# Patient Record
Sex: FEMALE | Race: WHITE
[De-identification: names, ages, dates, MRNs, and addresses within clinical notes are randomized per-mention and may not be internally consistent; named-entity substitution may affect disease eponyms.]

---

## 2018-04-16 ENCOUNTER — HOSPITAL ENCOUNTER (EMERGENCY)
Dept: HOSPITAL 96 - M.ERS | Age: 63
Discharge: HOME | End: 2018-04-16
Payer: COMMERCIAL

## 2018-04-16 VITALS — DIASTOLIC BLOOD PRESSURE: 57 MMHG | SYSTOLIC BLOOD PRESSURE: 122 MMHG

## 2018-04-16 VITALS — HEIGHT: 61 IN | WEIGHT: 180.01 LBS | BODY MASS INDEX: 33.99 KG/M2

## 2018-04-16 DIAGNOSIS — E03.9: ICD-10-CM

## 2018-04-16 DIAGNOSIS — D64.9: Primary | ICD-10-CM

## 2018-04-16 LAB
%HYPO/RBC NFR BLD AUTO: (no result) %
ABSOLUTE BASOPHILS: 0 THOU/UL (ref 0–0.2)
ABSOLUTE EOSINOPHILS: 0.3 THOU/UL (ref 0–0.7)
ABSOLUTE MONOCYTES: 0.4 THOU/UL (ref 0–1.2)
ALBUMIN SERPL-MCNC: 3.5 G/DL (ref 3.4–5)
ALP SERPL-CCNC: 132 U/L (ref 46–116)
ALT SERPL-CCNC: 94 U/L (ref 30–65)
ANION GAP SERPL CALC-SCNC: 9 MMOL/L (ref 7–16)
ANISOCYTOSIS BLD QL SMEAR: (no result)
AST SERPL-CCNC: 66 U/L (ref 15–37)
BASOPHILS NFR BLD AUTO: 0.8 %
BILIRUB SERPL-MCNC: 0.6 MG/DL
BUN SERPL-MCNC: 14 MG/DL (ref 7–18)
CALCIUM SERPL-MCNC: 9.5 MG/DL (ref 8.5–10.1)
CHLORIDE SERPL-SCNC: 104 MMOL/L (ref 98–107)
CO2 SERPL-SCNC: 27 MMOL/L (ref 21–32)
CREAT SERPL-MCNC: 0.7 MG/DL (ref 0.6–1.3)
EOSINOPHIL NFR BLD: 5.4 %
GLUCOSE SERPL-MCNC: 112 MG/DL (ref 70–99)
GRANULOCYTES NFR BLD MANUAL: 67.2 %
HCT VFR BLD CALC: 23.1 % (ref 37–47)
HGB BLD-MCNC: 6.4 GM/DL (ref 12–15)
IRON SERPL-MCNC: 13 UG/DL (ref 50–175)
LYMPHOCYTES # BLD: 1.1 THOU/UL (ref 0.8–5.3)
LYMPHOCYTES NFR BLD AUTO: 19.6 %
MCH RBC QN AUTO: 16.1 PG (ref 26–34)
MCHC RBC AUTO-ENTMCNC: 27.6 G/DL (ref 28–37)
MCV RBC: 58.3 FL (ref 80–100)
MICROCYTES: (no result)
MONOCYTES NFR BLD: 7 %
MPV: 9 FL. (ref 7.2–11.1)
NEUTROPHILS # BLD: 3.6 THOU/UL (ref 1.6–8.1)
NUCLEATED RBCS: 0 /100WBC
OVALOCYTES BLD QL SMEAR: (no result)
PLATELET # BLD EST: ADEQUATE 10*3/UL
PLATELET COUNT*: 214 THOU/UL (ref 150–400)
POIKILOCYTOSIS BLD QL SMEAR: (no result)
POTASSIUM SERPL-SCNC: 4.2 MMOL/L (ref 3.5–5.1)
PROT SERPL-MCNC: 7.8 G/DL (ref 6.4–8.2)
RBC # BLD AUTO: 3.97 MIL/UL (ref 4.2–5)
RDW-CV: 21.7 % (ref 10.5–14.5)
SAO2 % BLD FROM PO2: 2 % (ref 20–39)
SODIUM SERPL-SCNC: 140 MMOL/L (ref 136–145)
WBC # BLD AUTO: 5.4 THOU/UL (ref 4–11)

## 2020-06-11 ENCOUNTER — HOSPITAL ENCOUNTER (INPATIENT)
Dept: HOSPITAL 96 - M.ERS | Age: 65
LOS: 2 days | Discharge: HOME | DRG: 446 | End: 2020-06-13
Attending: INTERNAL MEDICINE | Admitting: INTERNAL MEDICINE
Payer: COMMERCIAL

## 2020-06-11 VITALS — SYSTOLIC BLOOD PRESSURE: 98 MMHG | DIASTOLIC BLOOD PRESSURE: 75 MMHG

## 2020-06-11 VITALS — SYSTOLIC BLOOD PRESSURE: 138 MMHG | DIASTOLIC BLOOD PRESSURE: 64 MMHG

## 2020-06-11 VITALS — DIASTOLIC BLOOD PRESSURE: 76 MMHG | SYSTOLIC BLOOD PRESSURE: 176 MMHG

## 2020-06-11 VITALS — HEIGHT: 60.98 IN | WEIGHT: 190 LBS | BODY MASS INDEX: 35.87 KG/M2

## 2020-06-11 VITALS — DIASTOLIC BLOOD PRESSURE: 67 MMHG | SYSTOLIC BLOOD PRESSURE: 144 MMHG

## 2020-06-11 DIAGNOSIS — E66.9: ICD-10-CM

## 2020-06-11 DIAGNOSIS — D25.9: ICD-10-CM

## 2020-06-11 DIAGNOSIS — R74.0: ICD-10-CM

## 2020-06-11 DIAGNOSIS — K80.11: Primary | ICD-10-CM

## 2020-06-11 DIAGNOSIS — E03.9: ICD-10-CM

## 2020-06-11 DIAGNOSIS — K80.20: ICD-10-CM

## 2020-06-11 DIAGNOSIS — K42.9: ICD-10-CM

## 2020-06-11 LAB
ABSOLUTE BASOPHILS: 0.1 THOU/UL (ref 0–0.2)
ABSOLUTE EOSINOPHILS: 0.2 THOU/UL (ref 0–0.7)
ABSOLUTE MONOCYTES: 0.5 THOU/UL (ref 0–1.2)
ALBUMIN SERPL-MCNC: 3.6 G/DL (ref 3.4–5)
ALP SERPL-CCNC: 111 U/L (ref 46–116)
ALT SERPL-CCNC: 106 U/L (ref 30–65)
ANION GAP SERPL CALC-SCNC: 6 MMOL/L (ref 7–16)
AST SERPL-CCNC: 81 U/L (ref 15–37)
BASOPHILS NFR BLD AUTO: 1.3 %
BILIRUB SERPL-MCNC: 0.6 MG/DL
BILIRUB UR-MCNC: NEGATIVE MG/DL
BUN SERPL-MCNC: 14 MG/DL (ref 7–18)
CALCIUM SERPL-MCNC: 9 MG/DL (ref 8.5–10.1)
CHLORIDE SERPL-SCNC: 105 MMOL/L (ref 98–107)
CO2 SERPL-SCNC: 29 MMOL/L (ref 21–32)
COLOR UR: YELLOW
CREAT SERPL-MCNC: 0.9 MG/DL (ref 0.6–1.3)
EOSINOPHIL NFR BLD: 2.7 %
GLUCOSE SERPL-MCNC: 148 MG/DL (ref 70–99)
GRANULOCYTES NFR BLD MANUAL: 78.3 %
HCT VFR BLD CALC: 41.8 % (ref 37–47)
HGB BLD-MCNC: 14.1 GM/DL (ref 12–15)
KETONES UR STRIP-MCNC: NEGATIVE MG/DL
LIPASE: 176 U/L (ref 73–393)
LYMPHOCYTES # BLD: 1 THOU/UL (ref 0.8–5.3)
LYMPHOCYTES NFR BLD AUTO: 12.1 %
MCH RBC QN AUTO: 25.9 PG (ref 26–34)
MCHC RBC AUTO-ENTMCNC: 33.8 G/DL (ref 28–37)
MCV RBC: 76.7 FL (ref 80–100)
MONOCYTES NFR BLD: 5.6 %
MPV: 9.1 FL. (ref 7.2–11.1)
NEUTROPHILS # BLD: 6.5 THOU/UL (ref 1.6–8.1)
NUCLEATED RBCS: 0 /100WBC
PLATELET COUNT*: 171 THOU/UL (ref 150–400)
POTASSIUM SERPL-SCNC: 4.1 MMOL/L (ref 3.5–5.1)
PROT SERPL-MCNC: 7.8 G/DL (ref 6.4–8.2)
PROT UR QL STRIP: (no result)
RBC # BLD AUTO: 5.44 MIL/UL (ref 4.2–5)
RBC # UR STRIP: (no result) /UL
RDW-CV: 23.4 % (ref 10.5–14.5)
SODIUM SERPL-SCNC: 140 MMOL/L (ref 136–145)
SP GR UR STRIP: 1.02 (ref 1–1.03)
URINE CLARITY: CLEAR
URINE GLUCOSE-RANDOM: NEGATIVE
URINE LEUKOCYTES-REFLEX: NEGATIVE
URINE NITRITE-REFLEX: NEGATIVE
UROBILINOGEN UR STRIP-ACNC: 0.2 E.U./DL (ref 0.2–1)
WBC # BLD AUTO: 8.3 THOU/UL (ref 4–11)

## 2020-06-11 NOTE — EKG
[place] [address]
Phone:  [phone]                     ELECTROCARDIOGRAM REPORT      
_______________________________________________________________________________
 
Name:         GIANNA CABALLERO              Room:          [address] [place] 
.R.#:    [id number]     Account #:     [id number]  
Admission:    20    Attend Phys:   Deo Angel, 
Discharge:                Date of Birth: [birth year]  
Date of Service: 20 0939  Report #:      [id number]
        [id number]
_______________________________________________________________________________
THIS REPORT FOR:  //name//                      
 
                         [facility] ED
                                       
Test Date:    2020               Test Time:    09:39:38
Pat Name:     GIANNA CABALLERO           Department:   
Patient ID:   [id number]            Room:         [facility]
Gender:       F                        Technician:   MS
:          1955               Requested By: Bud Baig
Order Number: [id number] MD:   Keith Jane
                                 Measurements
Intervals                              Axis          
Rate:         59                       P:            -10
WI:           207                      QRS:          19
QRSD:         106                      T:            29
QT:           440                                    
QTc:          436                                    
                           Interpretive Statements
Sinus rhythm
small inferior Q waves, nondiagnostic
Baseline wander in lead(s) III,aVF
No previous ECG available for comparison
 
Electronically Signed On 2020 16:05:29 CDT by Keith Jane
[email or web address]
 
 
 
 
 
 
 
 
 
 
 
 
 
 
 
 
 
 
 
  <ELECTRONICALLY SIGNED>
                                           By: Keith Jane MD, [facility]     
  20     1605
D: 20 0939   _____________________________________
T: 20 0939   Keith Jane MD, [facility]       /EPI

## 2020-06-12 VITALS — DIASTOLIC BLOOD PRESSURE: 57 MMHG | SYSTOLIC BLOOD PRESSURE: 109 MMHG

## 2020-06-12 VITALS — SYSTOLIC BLOOD PRESSURE: 137 MMHG | DIASTOLIC BLOOD PRESSURE: 78 MMHG

## 2020-06-12 VITALS — DIASTOLIC BLOOD PRESSURE: 68 MMHG | SYSTOLIC BLOOD PRESSURE: 131 MMHG

## 2020-06-12 LAB
ALBUMIN SERPL-MCNC: 3.1 G/DL (ref 3.4–5)
ALP SERPL-CCNC: 97 U/L (ref 46–116)
ALT SERPL-CCNC: 99 U/L (ref 30–65)
ANION GAP SERPL CALC-SCNC: 5 MMOL/L (ref 7–16)
AST SERPL-CCNC: 84 U/L (ref 15–37)
BILIRUB SERPL-MCNC: 0.6 MG/DL
BUN SERPL-MCNC: 6 MG/DL (ref 7–18)
CALCIUM SERPL-MCNC: 8.5 MG/DL (ref 8.5–10.1)
CHLORIDE SERPL-SCNC: 108 MMOL/L (ref 98–107)
CO2 SERPL-SCNC: 28 MMOL/L (ref 21–32)
CREAT SERPL-MCNC: 0.9 MG/DL (ref 0.6–1.3)
GLUCOSE SERPL-MCNC: 100 MG/DL (ref 70–99)
HCT VFR BLD CALC: 38.8 % (ref 37–47)
HGB BLD-MCNC: 12.8 GM/DL (ref 12–15)
MAGNESIUM SERPL-MCNC: 2 MG/DL (ref 1.8–2.4)
MCH RBC QN AUTO: 25.5 PG (ref 26–34)
MCHC RBC AUTO-ENTMCNC: 32.9 G/DL (ref 28–37)
MCV RBC: 77.6 FL (ref 80–100)
MPV: 9.2 FL. (ref 7.2–11.1)
PLATELET COUNT*: 143 THOU/UL (ref 150–400)
POTASSIUM SERPL-SCNC: 3.6 MMOL/L (ref 3.5–5.1)
PROT SERPL-MCNC: 6.8 G/DL (ref 6.4–8.2)
RBC # BLD AUTO: 5 MIL/UL (ref 4.2–5)
RDW-CV: 23.4 % (ref 10.5–14.5)
SODIUM SERPL-SCNC: 141 MMOL/L (ref 136–145)
WBC # BLD AUTO: 4.9 THOU/UL (ref 4–11)

## 2020-06-12 NOTE — NUR
PT REMAINED ALERT AND ORIENTED. PT TO HAVE ERCP TOMORROW. NPO AFTER MIDNIGHT.
PT IS STRESSED ABOUT SITUATION AND FAMILY STRESS. FALL RISK PRECAUTIONS IN
PLACE. HOURLY ROUNDING COMPLETED. WILL CONTINUE TO MONITOR.

## 2020-06-12 NOTE — NUR
PATIENT HAS REMAINED ALERT AND ORIENTED X 4 THROUGHOUT THE SHIFT AND RESTING
QUIETLY ON HOURLY ROUNDS. NO ABDOMINAL PAIN AS OF THIS WRITING. ICE CHIPS
EARLY IN SHIFT AND NPO AT MIDNIGHT FOR US THIS AM. VITAL SIGNS STABLE. IVF'S
PER ORDER. UP INDEPENDENTLY IN THE ROOM WITH STEADY GAIT. CONTINUE TO MONITOR.

## 2020-06-13 VITALS — SYSTOLIC BLOOD PRESSURE: 137 MMHG | DIASTOLIC BLOOD PRESSURE: 63 MMHG

## 2020-06-13 VITALS — DIASTOLIC BLOOD PRESSURE: 63 MMHG | SYSTOLIC BLOOD PRESSURE: 137 MMHG

## 2020-06-13 VITALS — DIASTOLIC BLOOD PRESSURE: 78 MMHG | SYSTOLIC BLOOD PRESSURE: 169 MMHG

## 2020-06-13 LAB
ALBUMIN SERPL-MCNC: 3 G/DL (ref 3.4–5)
ALP SERPL-CCNC: 96 U/L (ref 46–116)
ALT SERPL-CCNC: 106 U/L (ref 30–65)
ANION GAP SERPL CALC-SCNC: 7 MMOL/L (ref 7–16)
AST SERPL-CCNC: 98 U/L (ref 15–37)
BILIRUB SERPL-MCNC: 0.6 MG/DL
BUN SERPL-MCNC: 6 MG/DL (ref 7–18)
CALCIUM SERPL-MCNC: 8.4 MG/DL (ref 8.5–10.1)
CHLORIDE SERPL-SCNC: 108 MMOL/L (ref 98–107)
CO2 SERPL-SCNC: 27 MMOL/L (ref 21–32)
CREAT SERPL-MCNC: 0.8 MG/DL (ref 0.6–1.3)
GLUCOSE SERPL-MCNC: 100 MG/DL (ref 70–99)
MAGNESIUM SERPL-MCNC: 1.9 MG/DL (ref 1.8–2.4)
POTASSIUM SERPL-SCNC: 3.5 MMOL/L (ref 3.5–5.1)
PROT SERPL-MCNC: 6.7 G/DL (ref 6.4–8.2)
SODIUM SERPL-SCNC: 142 MMOL/L (ref 136–145)

## 2020-06-13 PROCEDURE — 0FC98ZZ EXTIRPATION OF MATTER FROM COMMON BILE DUCT, VIA NATURAL OR ARTIFICIAL OPENING ENDOSCOPIC: ICD-10-PCS | Performed by: INTERNAL MEDICINE

## 2020-06-13 NOTE — NUR
PATIENT HAS REMAINED ALERT AND ORIENTED X 4 THROUGHOUT THE SHIFT AND RESTING
QUIETLY ON HOURLY ROUNDS. UP INDEPENDENTLY IN THE ROOM. SOME STATED STOMACH
DISCOMFORT SHE FEELS FROM NOT EATING. DENIES NAUSEA OR PAIN ASSOCIATED WITH
HER GALLBLADDER ISSUES. TYLENOL PROVIDED X 1 FOR HEADACHE . VITAL SIGNS
STABLE. PLANNED ERCP THIS AM. PATIENT VERBALZING ANXIETY/FRUSTRATION IN
KNOWING WHAT SHE SHOULD DO IF SURGERY IS RECOMMENDED AND HAVING FINANCIAL
CONCERNS AS WELL. PRE-PROCEDURE ROUTINES IN PROGRESS. CONTINUE TO MONITOR.

## 2020-06-13 NOTE — NUR
PT REMAINED ALERT AND ORIENTED. PT GIVEN DISCHARGE INFORMATION, CARE NOTES,
AND PRESCRIPTIONS. IV REMOVED. PT BELONGINGS GATHERED. PT TOLERATED MEAL. FALL
RISK PRECAUTIONS IN PLACE. HOURLY ROUNDING COMPLETED. PT LEFT VIA WHEELCHAIR
WITH NURSING STAFF TO HOME.

## 2020-07-05 NOTE — CON
[address]  [address]                    CONSULTATION                  
_______________________________________________________________________________
 
Name:       GIANNA CABALLERO               Room:           [address]#:  [id number]      Account #:      [id number]  
Admission:  20     Attend Phys:    Deo Angel MD 
Discharge:  20     Date of Birth:  [birth year]  
         Report #: [id number]
                                                                     [id number]  
_______________________________________________________________________________
THIS REPORT FOR:  //name//                      
 
cc:  So Potter MD, Ami MD                                                       ~
THIS REPORT FOR:  //name//                      
 
CC: So Angel
 
DATE OF SERVICE:  2020
 
 
HISTORY OF PRESENT ILLNESS:  This is a pleasant 64-year-old female with past
medical history significant for cholelithiasis, who is presenting for evaluation
of acute abdominal pain.  The patient reports the abdominal pain is located in
the epigastric region.  It is localized, radiates to the back and sometimes
radiates to the right side, associated with nausea and vomiting.  The patient
reports she has had similar episodes in the past, but they would usually subside
in less than 30-40 minutes, but this episode remained persistent, which prompted
her hospital visit.  The patient denies any fevers, chills, episodes of
jaundice.  The patient reports that she did have bile duct stones in the past,
but they passed spontaneously and therefore nothing further was required.
 
PAST MEDICAL HISTORY:  Hypothyroidism.
 
PAST SURGICAL HISTORY:  .
 
SOCIAL HISTORY:  The patient denies recreational drug use and smoking, takes
alcohol occasionally.
 
FAMILY HISTORY:  No family history of colon cancer or Mitchell-related neoplasia.
 
REVIEW OF SYSTEMS:  Comprehensive 10-point review of systems is negative except
for what was mentioned in HPI.
 
PHYSICAL EXAMINATION:
VITAL SIGNS:  Temperature 36.6, pulse rate 65, respirations 16, blood pressure
137/78.
GENERAL:  The patient is alert, awake, oriented x 3.
HEENT:  Pupils are equal, round, reactive to light and accommodation.  Mucous
membranes are moist.  There is no congestion.
LUNGS:  Clear to auscultation bilaterally.
CARDIOVASCULAR:  Rate and rhythm regular, S1, S2 present.
ABDOMEN:  Soft.  There is no distention, guarding or rigidity.  Mild tenderness
to deep palpation in the epigastric region.
EXTREMITIES:  Warm, well perfused.  There is no edema.
 
 
 
[place] [address]                    CONSULTATION                  
_______________________________________________________________________________
 
Name:       MELINA CABALLEROJENNIFER AREVALO               Room:           [address]#:  [id number]      Account #:      [id number]  
Admission:  20     Attend Phys:    Deo Angel MD 
Discharge:  20     Date of Birth:  [birth year]  
         Report #: [id number]
                                                                     [id number]  
_______________________________________________________________________________
SKIN:  Warm and dry.
 
LABORATORY DATA:  Hemoglobin 12.8, hematocrit 38.8, platelet count 143, WBC
count 4.9.  Sodium 141, potassium 3.6, chloride 108, bicarbonate 28, BUN 6,
creatinine 0.9, total bilirubin 0.6, AST 84, ALT 99, alkaline phosphatase 97.
 
IMAGING:  MRCP abdomen, choledocholithiasis with 9-mm distal common bile duct
stone ____ urinary tract with extrahepatic biliary ductal dilation.
 
ASSESSMENT AND PLAN:  Pleasant 64-year-old female with choledocholithiasis.  I
would recommend ERCP tomorrow.  Further recommendations can be based on results
of the ERCP.  The patient was noted to have large recurrent fibroid or mass.  We
will recommend for her to get outpatient followup for the same.
 
Thank you for this consultation.
 
 
 
 
 
 
 
 
 
 
 
 
 
 
 
 
 
 
 
 
 
 
 
 
 
 
 
 
 
<ELECTRONICALLY SIGNED>
                                        By:  Alon Garrido MD         
20     0828
D: 20 1918_______________________________________
T: 20 2149Alon Garrido MD            /nt